# Patient Record
Sex: FEMALE | Race: WHITE
[De-identification: names, ages, dates, MRNs, and addresses within clinical notes are randomized per-mention and may not be internally consistent; named-entity substitution may affect disease eponyms.]

---

## 2022-08-15 ENCOUNTER — HOSPITAL ENCOUNTER (EMERGENCY)
Dept: HOSPITAL 47 - EC | Age: 42
LOS: 1 days | Discharge: HOME | End: 2022-08-16
Payer: COMMERCIAL

## 2022-08-15 VITALS
TEMPERATURE: 97.9 F | HEART RATE: 87 BPM | RESPIRATION RATE: 20 BRPM | SYSTOLIC BLOOD PRESSURE: 151 MMHG | DIASTOLIC BLOOD PRESSURE: 85 MMHG

## 2022-08-15 DIAGNOSIS — Z87.891: ICD-10-CM

## 2022-08-15 DIAGNOSIS — L25.9: Primary | ICD-10-CM

## 2022-08-15 DIAGNOSIS — Z88.1: ICD-10-CM

## 2022-08-15 PROCEDURE — 99283 EMERGENCY DEPT VISIT LOW MDM: CPT

## 2022-08-15 PROCEDURE — 96372 THER/PROPH/DIAG INJ SC/IM: CPT

## 2022-08-16 NOTE — ED
Skin/Abscess/FB HPI





- General


Chief complaint: Skin/Abscess/Foreign Body


Stated complaint: LT foot rash/infection


Time Seen by Provider: 08/16/22 00:01


Source: patient, family, RN notes reviewed


Mode of arrival: ambulatory


Limitations: no limitations





- History of Present Illness


Initial comments: 





Patient has had a rash on her left foot for over 3 months.  She now has a more 

generalized rash.  Patient recently moved to the area and has been doing some 

yardwork and benefits as well as to some brush and weeds.  Patient denying any 

shortness breath or chest pain.  No fever or chills.  Patient states that the 

rash involving mainly her lower extremities is quite itchy.  Patient has tried 

no treatment other than bag balm.





No headache, no fever or chills, no changes in vision or hearing, no sore throat

or difficulty with speech, no neck pain, no chest pain or shortness of breath, 

no abdominal pain, no nausea or vomiting, no changes in urination or bowel 

movements, no numbness or tingling, no extremity pain,





Past medical, surgical, social, and family history reviewed.


MD complaint: rash





- Related Data


                                  Previous Rx's











 Medication  Instructions  Recorded


 


diphenhydrAMINE [Benadryl] 25 mg PO QID PRN #24 capsule 08/16/22


 


predniSONE [Deltasone] 20 mg PO AS DIRECTED #21 tab 08/16/22











                                    Allergies











Allergy/AdvReac Type Severity Reaction Status Date / Time


 


cephalexin [From Keflex] Allergy  Rash/Hives Verified 08/15/22 23:58














Review of Systems


ROS Statement: 


Those systems with pertinent positive or pertinent negative responses have been 

documented in the HPI.





ROS Other: All systems not noted in ROS Statement are negative.





Past Medical History


Past Medical History: No Reported History


Additional Past Medical History / Comment(s): vitamin B deficiency


History of Any Multi-Drug Resistant Organisms: None Reported


Past Surgical History: No Surgical Hx Reported


Past Psychological History: Anxiety


Smoking Status: Former smoker


Past Alcohol Use History: None Reported


Past Drug Use History: None Reported





General Exam





- General Exam Comments


Initial Comments: 





She does not appear to be ill or toxic.  Vital signs stable, patient afebrile.  

Patient somewhat hypertensive.


Limitations: no limitations


General appearance: alert, in no apparent distress


Head exam: Present: atraumatic, normocephalic, normal inspection


Eye exam: Present: normal appearance, PERRL, EOMI.  Absent: scleral icterus, 

conjunctival injection, periorbital swelling


ENT exam: Present: normal exam, mucous membranes moist.  Absent: mucous 

membranes dry


Neck exam: Present: normal inspection, full ROM.  Absent: tenderness, 

meningismus, lymphadenopathy


Respiratory exam: Present: normal lung sounds bilaterally.  Absent: respiratory 

distress, wheezes, rales, rhonchi, stridor


Cardiovascular Exam: Present: regular rate, normal rhythm, normal heart sounds. 

Absent: systolic murmur, diastolic murmur, rubs, gallop, clicks


GI/Abdominal exam: Present: soft, normal bowel sounds.  Absent: distended, 

tenderness, guarding, rebound, rigid


Extremities exam: Present: normal inspection, full ROM, normal capillary refill.

 Absent: tenderness, pedal edema, joint swelling, calf tenderness


Back exam: Present: normal inspection


Neurological exam: Present: alert, oriented X3, CN II-XII intact


Psychiatric exam: Present: normal affect, normal mood


Skin exam: Present: warm, dry, intact, normal color, vesicles.  Absent: rash, 

urticaria, petechiae, pallor, mottled, abrasion (Patient has a scaling, 

vesicular, linear rash consistent with contact dermatitis.  No evidence of 

secondary infection.  No evidence of cellulitis.  No pustules.)





Course


                                   Vital Signs











  08/15/22





  23:53


 


Temperature 97.9 F


 


Pulse Rate 87


 


Respiratory 20





Rate 


 


Blood Pressure 151/85


 


O2 Sat by Pulse 100





Oximetry 














Medical Decision Making





- Medical Decision Making





Patient's pleuritic rash appears to be consistent with contact dermatitis.  

Patient does not appear to be ill or toxic otherwise.  We'll treat with 

corticosteroid taper.  Discussed irritant avoidance.  Discussed follow-up.  

Patient needs to establish care with a primary care physician here in this area.

 I did provide follow up with both primary care physician and dermatology.





This rash does not appear to be consistent with infectious etiology.





Patient was told to return to the ER for any signs or symptoms worsen.  Told to 

return immediately if any other problems arise.  All questions answered.  Roderick

atment plan discussed.  Patient in agreement


Every effort has been made to ensure accuracy of this dictation.  However, due 

to the limitations of electronic medical records and dictation devices, errors 

in charting still occur.





Supervising physician Dr. Soto





Disposition


Clinical Impression: 


 Contact dermatitis





Disposition: HOME SELF-CARE


Condition: Good


Instructions (If sedation given, give patient instructions):  Contact Dermatitis

(ED)


Additional Instructions: 


Follow-up with your regular physician as directed.  Return to the ER immediately

if any symptoms worsen, new symptoms arise, or any other problems develop..  You

should make a follow-up appointment with the family physician, Dr. Erickson, to 

establish care with somebody here in this area.  Due to the length of time he 

had the rash, seeing a dermatologist would also be recommended.  Make an 

appointment with Dr. Saeed


Prescriptions: 


diphenhydrAMINE [Benadryl] 25 mg PO QID PRN #24 capsule


 PRN Reason: Itching


predniSONE [Deltasone] 20 mg PO AS DIRECTED #21 tab


Is patient prescribed a controlled substance at d/c from ED?: No


Referrals: 


Derrell Saeed MD [STAFF PHYSICIAN] - As Soon As Possible


Carlo Erickson MD [REFERRING] - As Soon As Possible


Time of Disposition: 00:16

## 2022-09-10 ENCOUNTER — HOSPITAL ENCOUNTER (EMERGENCY)
Dept: HOSPITAL 47 - EC | Age: 42
Discharge: HOME | End: 2022-09-10
Payer: SELF-PAY

## 2022-09-10 VITALS — TEMPERATURE: 97.5 F

## 2022-09-10 VITALS — DIASTOLIC BLOOD PRESSURE: 90 MMHG | HEART RATE: 94 BPM | RESPIRATION RATE: 20 BRPM | SYSTOLIC BLOOD PRESSURE: 162 MMHG

## 2022-09-10 DIAGNOSIS — R21: Primary | ICD-10-CM

## 2022-09-10 DIAGNOSIS — Z88.8: ICD-10-CM

## 2022-09-10 PROCEDURE — 99282 EMERGENCY DEPT VISIT SF MDM: CPT

## 2022-09-10 NOTE — ED
Skin/Abscess/FB HPI





- General


Chief complaint: Skin/Abscess/Foreign Body


Stated complaint: rash


Time Seen by Provider: 09/10/22 16:04


Source: patient, family, RN notes reviewed, old records reviewed


Mode of arrival: ambulatory


Limitations: no limitations





- History of Present Illness


Initial comments: 





This is a nontoxic-appearing 42-year-old female that presents to the emergency 

room with complaints of diffuse rash.  Patient states she was seen in the 

emergency room and prescribed prednisone taper which seemed to improve it 

however she is at the end of her taper in the rash is starting to return.  The 

redness on her left foot is increased in size and is now bruising.  She denies 

any fevers, no nausea vomiting or diarrhea.  She denies any medical history no 

medications on a daily basis.  She states she does have a history of vitamin B12

deficiency but has not been taking any supplementation.  She denies any drug use


MD complaint: rash


-: month(s)


Location: generalized


Consistency: constant


Improves with: other (steroids)


Associated symptoms: denies other symptoms


Treatments Prior to Arrival: other (steroids)





- Related Data


                                  Previous Rx's











 Medication  Instructions  Recorded


 


diphenhydrAMINE [Benadryl] 25 mg PO QID PRN #24 capsule 08/16/22


 


predniSONE [Deltasone] 20 mg PO AS DIRECTED #21 tab 08/16/22


 


Mupirocin 2% Oint [Bactroban 2% 1 applic TOPICAL TID #22 gm 09/10/22





Oint]  


 


predniSONE 50 mg PO DAILY #5 tab 09/10/22











                                    Allergies











Allergy/AdvReac Type Severity Reaction Status Date / Time


 


cephalexin [From Keflex] Allergy  Rash/Hives Verified 09/10/22 15:35














Review of Systems


ROS Statement: 


Those systems with pertinent positive or pertinent negative responses have been 

documented in the HPI.





ROS Other: All systems not noted in ROS Statement are negative.





Past Medical History


Past Medical History: No Reported History


Additional Past Medical History / Comment(s): vitamin B deficiency


History of Any Multi-Drug Resistant Organisms: None Reported


Past Surgical History: No Surgical Hx Reported


Past Psychological History: Anxiety


Smoking Status: Former smoker


Past Alcohol Use History: None Reported


Past Drug Use History: None Reported





General Exam


Limitations: no limitations


General appearance: alert, in no apparent distress


Head exam: Present: atraumatic, normocephalic, normal inspection


Eye exam: Absent: scleral icterus, conjunctival injection, periorbital swelling


ENT exam: Present: mucous membranes moist


Neck exam: Present: full ROM.  Absent: tenderness, meningismus


Respiratory exam: Absent: respiratory distress, accessory muscle use


Cardiovascular Exam: Present: tachycardia


Extremities exam: Present: full ROM, normal capillary refill.  Absent: 

tenderness, pedal edema, joint swelling, calf tenderness


Back exam: Present: normal inspection.  Absent: tenderness, CVA tenderness (R), 

CVA tenderness (L), rash noted


Neurological exam: Present: alert, oriented X3, normal gait


Psychiatric exam: Present: normal affect, normal mood


Skin exam: Present: warm, dry, normal color, rash (Macular rash diffuse 

bilateral upper and lower extremities.;  4 cm circular erythema rash to the 

medial aspect of her left foot 2 cm circular erythema rash dorsum of the foot, 2

cm circular erythema rash lateral malleolus.)





Course


                                   Vital Signs











  09/10/22





  15:35


 


Temperature 97.5 F L


 


Pulse Rate 104 H


 


Respiratory 16





Rate 


 


Blood Pressure 151/102


 


O2 Sat by Pulse 100





Oximetry 














Medical Decision Making





- Medical Decision Making





Patient presents with a diffuse macular rash to bilateral upper and lower 

extremities with no noted vesicles, that has been present for 2 months. She 

first noticed rash to left lateral ankle when she was outside and felt like she 

was bitten by a spider to left lateral ankle.  She then developed 2 other 

circular lesions to the left foot. medial and dorsum of the foot with 

erythematous base.  She states it is not painful.  It is pruritic. 


Patient denies any fevers.  No nausea vomiting or diarrhea.  Denies sore throat.

 No lymphadenopathy noted.  No joint pain.  She is not taking medication on a 

daily basis.  She was seen here last month and just finished the prescribed 

prednisone taper which she states did improve rash but came back once she 

stopped the steroid.  


She will be prescribed mupirocin for the erythematous moist lesions to the left 

foot and prednisone 50 mg for the next 5 days. She was directed to follow up 

with dermatology.  Case discussed with Dr. Rivero





Disposition


Clinical Impression: 


 Rash





Disposition: HOME SELF-CARE


Condition: Good


Instructions (If sedation given, give patient instructions):  Acute Rash (ED), 

Dermatitis (ED)


Additional Instructions: 


Use mupirocin ointment 3 times a day for the next 5 days.  Take the prednisone 

as prescribed.  Follow-up with dermatology next week.


Prescriptions: 


Mupirocin 2% Oint [Bactroban 2% Oint] 1 applic TOPICAL TID #22 gm


predniSONE 50 mg PO DAILY #5 tab


Is patient prescribed a controlled substance at d/c from ED?: No


Referrals: 


None,Stated [Primary Care Provider] - 1-2 days


Derrell Saeed MD [STAFF PHYSICIAN] - 1-2 days


Time of Disposition: 16:23

## 2022-09-30 ENCOUNTER — HOSPITAL ENCOUNTER (EMERGENCY)
Dept: HOSPITAL 47 - EC | Age: 42
Discharge: HOME | End: 2022-09-30
Payer: COMMERCIAL

## 2022-09-30 VITALS — RESPIRATION RATE: 15 BRPM | DIASTOLIC BLOOD PRESSURE: 74 MMHG | SYSTOLIC BLOOD PRESSURE: 139 MMHG | HEART RATE: 81 BPM

## 2022-09-30 VITALS — TEMPERATURE: 97.9 F

## 2022-09-30 DIAGNOSIS — L30.9: Primary | ICD-10-CM

## 2022-09-30 DIAGNOSIS — F17.290: ICD-10-CM

## 2022-09-30 DIAGNOSIS — F41.9: ICD-10-CM

## 2022-09-30 DIAGNOSIS — Z79.899: ICD-10-CM

## 2022-09-30 DIAGNOSIS — Z88.1: ICD-10-CM

## 2022-09-30 PROCEDURE — 96372 THER/PROPH/DIAG INJ SC/IM: CPT

## 2022-09-30 PROCEDURE — 99282 EMERGENCY DEPT VISIT SF MDM: CPT

## 2022-09-30 NOTE — ED
Skin/Abscess/FB HPI





- General


Chief complaint: Skin/Abscess/Foreign Body


Stated complaint: rash on foot


Time Seen by Provider: 09/30/22 02:38


Source: patient, RN notes reviewed


Mode of arrival: ambulatory


Limitations: no limitations





- History of Present Illness


Initial comments: 


Patient is a 42-year-old  female presenting to the emergency room with 

recurrence of disseminated steroid responsive dermatitis. She states that she 

has now been to the emergency room 3 times for similar rash with each time 

seemingly worse after treatment. She has an appointment with dermatology 

scheduled for Monday, 10/03/2022. She states that she responds well to her 

steroid regimen and then approximately 48 hours afterwards her rash returns. She

states the rash is most severe to her extremities but does have some rash to her

trunk. She denies any known irritants or allergens.





- Related Data


                                  Previous Rx's











 Medication  Instructions  Recorded


 


diphenhydrAMINE [Benadryl] 25 mg PO QID PRN #24 capsule 08/16/22


 


predniSONE [Deltasone] 20 mg PO AS DIRECTED #21 tab 08/16/22


 


Mupirocin 2% Oint [Bactroban 2% 1 applic TOPICAL TID #22 gm 09/10/22





Oint]  


 


predniSONE 50 mg PO DAILY #5 tab 09/10/22


 


predniSONE 50 mg PO DAILY 5 Days #5 tab 09/30/22


 


methylPREDNISolone [Medrol Dose 0 mg PO AS DIRECTED #1 packet 10/07/22





Pack]  











                                    Allergies











Allergy/AdvReac Type Severity Reaction Status Date / Time


 


cephalexin [From Keflex] Allergy  Rash/Hives Verified 10/07/22 05:14














Review of Systems


ROS Statement: 


Those systems with pertinent positive or pertinent negative responses have been 

documented in the HPI.





ROS Other: All systems not noted in ROS Statement are negative.





Past Medical History


Past Medical History: No Reported History


Additional Past Medical History / Comment(s): vitamin B deficiency


History of Any Multi-Drug Resistant Organisms: None Reported


Past Surgical History: No Surgical Hx Reported


Past Psychological History: Anxiety


Smoking Status: Vaper


Past Alcohol Use History: None Reported


Past Drug Use History: None Reported





General Exam


Limitations: no limitations


General appearance: alert, in no apparent distress


Head exam: Present: atraumatic, normocephalic, normal inspection


Eye exam: Present: normal appearance, PERRL, EOMI.  Absent: scleral icterus, 

conjunctival injection, periorbital swelling


ENT exam: Present: normal exam, mucous membranes moist


Neck exam: Present: normal inspection


Respiratory exam: Absent: respiratory distress, accessory muscle use


GI/Abdominal exam: Absent: distended


Rectal exam: Present: deferred


Extremities exam: Present: tenderness (at rash locations), joint swelling (left 

ankle due to rash)


Back exam: Present: full ROM


Neurological exam: Present: alert, oriented X3, CN II-XII intact


Skin exam: Present: rash (Diffuse erythematous rash with some plaque formation 

noted to lower extremities. No vesicles or bullae noted.), urticaria





Course


                                   Vital Signs











  09/30/22 09/30/22





  02:12 03:55


 


Temperature 97.9 F 


 


Pulse Rate 103 H 81


 


Respiratory 16 15





Rate  


 


Blood Pressure 167/105 139/74


 


O2 Sat by Pulse 98 98





Oximetry  














Medical Decision Making





- Medical Decision Making


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 

Occurring point with dermatology. Given previous workups and upcoming 

appointment with dermatology will defer any diagnostic imaging or laboratory s

tudies at this time. Will give dose of IM Solu-Medrol. Will also give a dose of 

IM morphine for pain.





Pain and itching improved with solumedrol and Morphine. Will place on prednisone

50 mg once a day which will be completed after appointment with dermatology. 

Encouraged maintaining appointment with dermatology. Advised to avoid allergens 

along with lotions and soaps and detergents with fragrances or dyes.





 Case discussed with Dr. Payton





Disposition


Clinical Impression: 


 Diffuse dermatitis





Disposition: HOME SELF-CARE


Condition: Stable


Instructions (If sedation given, give patient instructions):  Dermatitis (ED)


Prescriptions: 


predniSONE 50 mg PO DAILY 5 Days #5 tab


Is patient prescribed a controlled substance at d/c from ED?: No


Referrals: 


None,Stated [Primary Care Provider] - 1-2 days


Derrell Saeed MD [STAFF PHYSICIAN] - 10/03/22